# Patient Record
Sex: FEMALE | Race: WHITE
[De-identification: names, ages, dates, MRNs, and addresses within clinical notes are randomized per-mention and may not be internally consistent; named-entity substitution may affect disease eponyms.]

---

## 2022-05-30 ENCOUNTER — NURSE TRIAGE (OUTPATIENT)
Dept: OTHER | Facility: CLINIC | Age: 27
End: 2022-05-30

## 2022-05-30 NOTE — TELEPHONE ENCOUNTER
Subjective: Caller states \"I went to urgent care on Saturday and they told me my stomach pain could be gastritis, celiac, crohn's, Gerd, gallbladder, or pancreatitis, but unable to test at the facility. .\"     Current Symptoms: constant lower to middle abdominal pain. Bloated, \"feels like a giant balloon. \"  Tender when palpated. Stomach is harder than normal.    Episodes can last a few minutes to days. When pressing on lower abdomen, can take a full deep breath. Otherwise has to work at getting a deep breath. Certain foods- coffee( had not had any coffee for about 3 days. , had some today and it did not agree. )sweet, fried, fatty too spicy aggravate the pain    BM helped decreased pain little , Laying on side helped. Cannot tolerate pressure on abdomen. Intermittent nausea. Stools can be loose, soft or firmer. Radiates into upper middle chest.  Feels like something is in throat., No trouble swallowing. Throat tender when pressing. Chest heaviness. Onset: 6 days ago; gradual, worsening    Associated Symptoms: reduced appetite, diarrhea, constipation    Pain Severity: 4-8/10; sharp, tender; constant, waxing and waning    Temperature: 98.7 by forehead thermometer    What has been tried: Pantoprazole 40 mg. Dicyclomine, Sucrafate 1 gm TID    LMP: last month Pregnant: No    Recommended disposition: See HCP within 4 Hours (or PCP triage)     Suggested [facility] or ED. Care advice provided, patient verbalizes understanding; denies any other questions or concerns; instructed to call back for any new or worsening symptoms. Patient/caller warm transferred to [facility] for appointment scheduling    This triage is a result of a call to [address] [place] [address]. Please do not respond to the triage nurse through this encounter. Any subsequent communication should be directly with the patient.         Reason for Disposition   [1] MILD-MODERATE pain AND [2] constant AND [3] present > 2 hours    Protocols used: ABDOMINAL PAIN - [facility] - [facility]ETE